# Patient Record
Sex: FEMALE | Race: BLACK OR AFRICAN AMERICAN | Employment: FULL TIME | URBAN - METROPOLITAN AREA
[De-identification: names, ages, dates, MRNs, and addresses within clinical notes are randomized per-mention and may not be internally consistent; named-entity substitution may affect disease eponyms.]

---

## 2021-05-19 NOTE — TELEPHONE ENCOUNTER
FUTURE VISIT INFORMATION      FUTURE VISIT INFORMATION:    Date: 6/29/21    Time: 10:00am    [place] [facility] [place]:    Referring provider:  self    Referring providers clinic:  N/A    Reason for visit/diagnosis  Pannicuelctomy    RECORDS REQUESTED FROM:       No recs to collect per Pt

## 2021-06-29 ENCOUNTER — PRE VISIT (OUTPATIENT)
Dept: PLASTIC SURGERY | Facility: CLINIC | Age: 41
End: 2021-06-29

## 2022-03-25 ENCOUNTER — OFFICE VISIT (OUTPATIENT)
Dept: PLASTIC SURGERY | Facility: AMBULATORY SURGERY CENTER | Age: 42
End: 2022-03-25
Payer: COMMERCIAL

## 2022-03-25 VITALS — HEIGHT: 67 IN | BODY MASS INDEX: 32.8 KG/M2 | WEIGHT: 209 LBS

## 2022-03-25 DIAGNOSIS — E65 ABDOMINAL PANNUS: Primary | ICD-10-CM

## 2022-03-25 PROCEDURE — 99204 OFFICE O/P NEW MOD 45 MIN: CPT | Performed by: PLASTIC SURGERY

## 2022-03-25 RX ORDER — CEFADROXIL 500 MG/1
CAPSULE ORAL
COMMUNITY
Start: 2021-11-26 | End: 2022-07-29

## 2022-03-25 RX ORDER — BENZOYL PEROXIDE 50 MG/ML
LIQUID TOPICAL
COMMUNITY
Start: 2022-03-16 | End: 2022-07-29

## 2022-03-25 NOTE — PROGRESS NOTES
Chief complaint:  Abdominal wall lipodystrophy, hanging abdominal wall pannus.     History of present illness:  This is 41 year old lady who presents with diffuse abdominal wall lipodystrophy as well as skin laxity and hanging abdominal wall pannus over the suprapubic region. Patient does complain of presence of intertrigo below the infra abdominal fold and behind the pannus for the last 3 years.  The episodes of intertrigo are recurrent and she treats them with nystatin powder as well of ointments and applying dry clothing to prevent wetness in the area.  She is interested in body contouring consultation to prevent these recurrent episodes of intertrigo.    Past medical history:  No past medical history on file.     Past surgical history:  Functional septoplasty      Allergies:  Sulfa drugs     Medications:       Current Outpatient Medications:      benzoyl peroxide 5 % external liquid, Apply to face and neck daily in shower, Disp: , Rfl:      BENZOYL PEROXIDE WASH 5 % external liquid, APPLY TO FACE AND NECK DAILY IN SHOWER, Disp: , Rfl:      cefadroxil (DURICEF) 500 MG capsule, , Disp: , Rfl:      cholecalciferol 50 MCG (2000 UT) CAPS, Take 2,000 Units by mouth, Disp: , Rfl:      Birth control pills: YES     Family history:  Denies venous Thromboembolism (VTE)    GYN history:  G 4, P 4     Social History:  Denies tobacco, drinks alcohol occasionally     Review of systems:  General ROS: No complaints or constitutional symptoms  Skin: Complains of multiple recurrent episodes of intertrigo in the infra abdominal fold area.  Hematologic/Lymphatic: No symptoms or complaints  Psychiatric: No symptoms or complaints  Endocrine: No excessive fatigue, no hypermetabolic symptoms reported  Respiratory ROS: No cough, shortness of breath, or wheezing  Cardiovascular ROS: No chest pain or dyspnea on exertion  Breast ROS: Denies nipple discharge, denies nipple inversion, denies palpable breast masses, denies changes in the color  "of the skin of both breasts  Gastrointestinal ROS: No abdominal pain, nausea, diarrhea, or constipation  Musculoskeletal ROS: Occasional back pain.  Neurological ROS: No focal neurologic defects reported.       Physical exam:     Ht 1.702 m (5' 7\")   Wt 94.8 kg (209 lb)   BMI 32.73 kg/m    General: Alert, cooperative, appears stated age   Skin: Skin color, texture, turgor normal, no rashes or lesions   Lymphatic: No obvious adenopathy, no swelling   Eyes: No scleral icterus, pupils equal  HENT: No traumatic injury to the head or face, no gross abnormalities  Lungs: Normal respiratory effort, breath sounds equal bilaterally  Heart: Regular rate and rhythm  Breasts: Not examined  Abdomen: Presents with diffuse lipodystrophy, striae and cellulite throughout the whole abdominal wall.  There is severe skin laxity throughout the whole anterior abdominal wall.  This skin laxity is more evident during flexion of her torso over the pelvis.  There is important amount of lipodystrophy on bilateral flank areas as well as in both supraumbilical and infraumbilical region.  There is hanging abdominal wall pannus over the suprapubic region.  The umbilicus is deformed secondary to the weight of the supraumbilical lipodystrophy.  The umbilicus is also inferiorly displaced secondary to the weight of the infra umbilical hanging pannus that is pulling the umbilicus down.  I did not see any evidence of intertrigo in the infra abdominal fold at this time.  Patient does present with diastases recti.  I did not palpate any evidence of umbilical hernias or incisional hernias.  Abdomen is otherwise soft, non-distended and non-tender to palpation.  Extremities: Patient does not present with varicose veins but she does have diffuse cellulite on bilateral thighs.  Neurologic: Grossly intact                                      Assessment:     41 year old female with abdominal wall lipodystrophy, excessive abdominal skin laxity and hanging " abdominal wall pannus with inferiorly displaced umbilicus.  She also presents with diastases recti.     Her Caprini score is as follows: one- point for BMI greater than 25, two-points because any body contouring procedure will take more than 45 minutes,  and one-point because she is taking birth control pills. Total risk factor score is 4 points.  Patient will not require postoperative chemoprophylaxis.        PLAN:      I had a lengthy discussion with Aditi about her body contouring options for her abdomen.  I showed her before and after pictures of body contouring procedures.     We discussed the options of panniculectomy, pzqcm-jn-oog's panniculectomy and cosmetic abdominoplasty with bilateral liposuction.     We talked that with plain panniculectomy she will still have a displaced umbilicus and excessive supraumbilical and epigastric area fullness, skin laxity and lipodystrophy.  With this technique there will no be plication of rectus abdominals or umbilical transposition. Therefore, she will still have diastases recti and displaced umbilicus.       With the krqat-qg-wgn's panniculectomy she will have a midline vertical scar as well as transverse lower abdominal scar.  However, with this technique the supraumbilical and epigastric fullness as well as bilateral flank lipodystrophy could be greatly improved.       With the cosmetic abdominoplasty and suction assisted liposuction (SAL), she will have rectus abdominal muscle plication as well as umbilical transposition and improvement of supraumbilical and epigastric area fullness as well as improvement of the bilateral flank lipodystrophy after SAL.  Her abdominoplasty scar will be below the bikini line.     We discussed that the panniculectomies are usually covered by insurance and the abdominoplasty with liposuction is not.     Patient told me that she is interested in having a dbkfr-ye-xbt's panniculectomy, with both vertical midline incision and the lower  abdominal transverse incision.  She told me that she does not mind the midline vertical scar.  She is greatly interested in removing the infra abdominal wall pannus as well as her supra umbilical and epigastric fullness that she is presenting with, plus the bilateral flank lipodystrophy.  She feels that the wkceh-tm-jgl's panniculectomy will improve those areas.     I agree with this approach.     I discussed with her that this is an outpatient procedure that usually last between 4 to 5 hours.       We also discussed the potential risks for surgery and they include but are not limited to: scarring, wound dehiscence, wound healing problems, bleeding and hematoma, infection, seroma, contour irregularities, intraperitoneal injury requiring laparotomy, risk for further surgeries.  Patient has acknowledged all these risks and is interested to proceed with surgery.      Time spent with patient 45 minutes.    Tonny Franco MD , FACS   Diplomate American Board of Plastic Surgery  Diplomate American Board of Surgery  [facility]  Division of Plastic & Reconstructive Surgery   Office: [phone]   3/26/2022 at 1:56 PM

## 2022-03-25 NOTE — LETTER
3/25/2022         RE: Aditi Turner  [address]  [facility] [address]        Dear Colleague,    Thank you for referring your patient, Aditi Turner, to the [facility]. Please see a copy of my visit note below.    Chief complaint:  Abdominal wall lipodystrophy, hanging abdominal wall pannus.     History of present illness:  This is 41 year old lady who presents with diffuse abdominal wall lipodystrophy as well as skin laxity and hanging abdominal wall pannus over the suprapubic region. Patient does complain of presence of intertrigo below the infra abdominal fold and behind the pannus for the last 3 years.  The episodes of intertrigo are recurrent and she treats them with nystatin powder as well of ointments and applying dry clothing to prevent wetness in the area.  She is interested in body contouring consultation to prevent these recurrent episodes of intertrigo.    Past medical history:  No past medical history on file.     Past surgical history:  Functional septoplasty      Allergies:  Sulfa drugs     Medications:       Current Outpatient Medications:      benzoyl peroxide 5 % external liquid, Apply to face and neck daily in shower, Disp: , Rfl:      BENZOYL PEROXIDE WASH 5 % external liquid, APPLY TO FACE AND NECK DAILY IN SHOWER, Disp: , Rfl:      cefadroxil (DURICEF) 500 MG capsule, , Disp: , Rfl:      cholecalciferol 50 MCG (2000 UT) CAPS, Take 2,000 Units by mouth, Disp: , Rfl:      Birth control pills: YES     Family history:  Denies venous Thromboembolism (VTE)    GYN history:  G 4, P 4     Social History:  Denies tobacco, drinks alcohol occasionally     Review of systems:  General ROS: No complaints or constitutional symptoms  Skin: Complains of multiple recurrent episodes of intertrigo in the infra abdominal fold area.  Hematologic/Lymphatic: No symptoms or complaints  Psychiatric: No symptoms or complaints  Endocrine: No excessive fatigue, no  "hypermetabolic symptoms reported  Respiratory ROS: No cough, shortness of breath, or wheezing  Cardiovascular ROS: No chest pain or dyspnea on exertion  Breast ROS: Denies nipple discharge, denies nipple inversion, denies palpable breast masses, denies changes in the color of the skin of both breasts  Gastrointestinal ROS: No abdominal pain, nausea, diarrhea, or constipation  Musculoskeletal ROS: Occasional back pain.  Neurological ROS: No focal neurologic defects reported.       Physical exam:     Ht 1.702 m (5' 7\")   Wt 94.8 kg (209 lb)   BMI 32.73 kg/m    General: Alert, cooperative, appears stated age   Skin: Skin color, texture, turgor normal, no rashes or lesions   Lymphatic: No obvious adenopathy, no swelling   Eyes: No scleral icterus, pupils equal  HENT: No traumatic injury to the head or face, no gross abnormalities  Lungs: Normal respiratory effort, breath sounds equal bilaterally  Heart: Regular rate and rhythm  Breasts: Not examined  Abdomen: Presents with diffuse lipodystrophy, striae and cellulite throughout the whole abdominal wall.  There is severe skin laxity throughout the whole anterior abdominal wall.  This skin laxity is more evident during flexion of her torso over the pelvis.  There is important amount of lipodystrophy on bilateral flank areas as well as in both supraumbilical and infraumbilical region.  There is hanging abdominal wall pannus over the suprapubic region.  The umbilicus is deformed secondary to the weight of the supraumbilical lipodystrophy.  The umbilicus is also inferiorly displaced secondary to the weight of the infra umbilical hanging pannus that is pulling the umbilicus down.  I did not see any evidence of intertrigo in the infra abdominal fold at this time.  Patient does present with diastases recti.  I did not palpate any evidence of umbilical hernias or incisional hernias.  Abdomen is otherwise soft, non-distended and non-tender to palpation.  Extremities: Patient " does not present with varicose veins but she does have diffuse cellulite on bilateral thighs.  Neurologic: Grossly intact                                      Assessment:     41 year old female with abdominal wall lipodystrophy, excessive abdominal skin laxity and hanging abdominal wall pannus with inferiorly displaced umbilicus.  She also presents with diastases recti.     Her Caprini score is as follows: one- point for BMI greater than 25, two-points because any body contouring procedure will take more than 45 minutes,  and one-point because she is taking birth control pills. Total risk factor score is 4 points.  Patient will not require postoperative chemoprophylaxis.        PLAN:      I had a lengthy discussion with Aditi about her body contouring options for her abdomen.  I showed her before and after pictures of body contouring procedures.     We discussed the options of panniculectomy, hpeov-uo-bct's panniculectomy and cosmetic abdominoplasty with bilateral liposuction.     We talked that with plain panniculectomy she will still have a displaced umbilicus and excessive supraumbilical and epigastric area fullness, skin laxity and lipodystrophy.  With this technique there will no be plication of rectus abdominals or umbilical transposition. Therefore, she will still have diastases recti and displaced umbilicus.       With the biypa-xj-lwu's panniculectomy she will have a midline vertical scar as well as transverse lower abdominal scar.  However, with this technique the supraumbilical and epigastric fullness as well as bilateral flank lipodystrophy could be greatly improved.       With the cosmetic abdominoplasty and suction assisted liposuction (SAL), she will have rectus abdominal muscle plication as well as umbilical transposition and improvement of supraumbilical and epigastric area fullness as well as improvement of the bilateral flank lipodystrophy after SAL.  Her abdominoplasty scar will be below the  bikini line.     We discussed that the panniculectomies are usually covered by insurance and the abdominoplasty with liposuction is not.     Patient told me that she is interested in having a dmebn-yc-uxq's panniculectomy, with both vertical midline incision and the lower abdominal transverse incision.  She told me that she does not mind the midline vertical scar.  She is greatly interested in removing the infra abdominal wall pannus as well as her supra umbilical and epigastric fullness that she is presenting with, plus the bilateral flank lipodystrophy.  She feels that the vmgtb-fx-yyy's panniculectomy will improve those areas.     I agree with this approach.     I discussed with her that this is an outpatient procedure that usually last between 4 to 5 hours.       We also discussed the potential risks for surgery and they include but are not limited to: scarring, wound dehiscence, wound healing problems, bleeding and hematoma, infection, seroma, contour irregularities, intraperitoneal injury requiring laparotomy, risk for further surgeries.  Patient has acknowledged all these risks and is interested to proceed with surgery.      Time spent with patient 45 minutes.    Tonny Franco MD , FACS   Diplomate American Board of Plastic Surgery  Diplomate American Board of Surgery  [facility]  Division of Plastic & Reconstructive Surgery   Office: [phone]   3/26/2022 at 1:56 PM       Please see dictated note.           Again, thank you for allowing me to participate in the care of your patient.        Sincerely,        Tonny Franco MD

## 2022-04-12 ENCOUNTER — DOCUMENTATION ONLY (OUTPATIENT)
Dept: PLASTIC SURGERY | Facility: AMBULATORY SURGERY CENTER | Age: 42
End: 2022-04-12
Payer: COMMERCIAL

## 2022-04-12 ENCOUNTER — TELEPHONE (OUTPATIENT)
Dept: SURGERY | Facility: CLINIC | Age: 42
End: 2022-04-12
Payer: COMMERCIAL

## 2022-04-12 NOTE — PROGRESS NOTES
PA Submitted to [facility]  DOS: 4/25/22 - 11/25/22  CPT Code: 44306, 37373  Dx Code(s): E65, L30.4, L98.7  Facility: [facility]

## 2022-04-12 NOTE — TELEPHONE ENCOUNTER
Health Call Center    Phone Message    May a detailed message be left on voicemail: yes     Reason for Call: Other: Aditi is calling in asking for a call back. She states that she spoke with her insurance company, and they have not received any information regarding her appt with Dr. Franco on 3/25. Pt is wondering if anything has been sent to them yet regarding her appt or surgery. Please call back as soon as possible to discuss.    Action Taken: Message routed to:  [facility] ([facility]    Travel Screening: Not Applicable

## 2022-04-13 NOTE — TELEPHONE ENCOUNTER
Called pt to let her know that her PA has been submitted, I let her know that a PA would take 2-4 weeks for a response and from there a surgery scheduler will contact her once an approval is received.

## 2022-04-19 ENCOUNTER — TELEPHONE (OUTPATIENT)
Dept: SURGERY | Facility: CLINIC | Age: 42
End: 2022-04-19
Payer: COMMERCIAL

## 2022-04-19 NOTE — LETTER
We've received instruction to get you scheduled for surgery with Dr Franco. We have that arranged as follows:     Surgery Date: 8/1/2022  Location: [facility]  Arrival Time: 7:00 am (Unless instructed differently by the pre-op call nurse)     Post op Appointment: 8/3/2022 at 1:15 pm with      Prep Instructions:     1. Please schedule a pre-op physical with your primary care doctor within 30 days prior to the surgery date. This may be virtual or face-to-face depending on your doctors preference. Call them right away to schedule this.    2. PCR-Rated COVID19 testing is required within 4 days of surgery. We have this scheduled for you at [facility], [address] [place] [address] on 7/28/2022 at 6:05 pm. Follow the specific instructions you receive by Katie. If your test is positive, your surgery will be canceled.     3. Nothing to eat or drink for 8 hours before surgery unless instructed differently by the pre-op nurse.    4. If the community sees a new COVID19 surge, your procedure may need to be postponed. We will contact you if this happens.     5. You will need an adult to drive you home and stay with you 24 hours after surgery. Public transportation or Medical Van Services are not permitted.    6. You may have one family member wait in the lobby at the surgery center during your surgery. Visitor restrictions are subject to change, please verify with the pre-op nurse when they call.    7. You will be screened for high-risk exposure to Covid-19 during the pre-op call.  We encourage you to quarantine yourself away from any Covid-19 people for 10 days before surgery to avoid possible last minute cancellations.   When you arrive to the surgery center, you will again be screened for COVID19 symptoms. If you screen positive, your surgery will need to be postponed.    8. We always encourage you to notify your insurance any time you have medical tests or procedures scheduled including  surgery. The number is usually right on the back of your insurance card. Please call [facility] Cost of Care at [phone] for the surgeon fees, and [facility] Cost of Care [phone] for facility fees if you need pricing information.     9. You will receive a call from a pre-op call nurse 1-3 days prior to surgery. She will go over more details with you.     Call our office if you have any questions! Thank you!

## 2022-04-19 NOTE — PROGRESS NOTES
Insurance [facility]  Auth #: [id number]  DOS: 4/25/2022 - 10/25/2022  CPT Code: 83169  Dx Code(s): E65, L30.4, L98.7  Facility: [facility]

## 2022-04-19 NOTE — TELEPHONE ENCOUNTER
Spoke with Melodie today regarding surgery scheduling     Patient was informed of the followin. Patient has been informed to consult their PCP/Cardiologist about stopping their blood thinners about a week before surgery.  2. Pre Op Physical will need to be done by PCP or Surgeon see below  3. Required Covid Test with in 4 days prior to surgery. (See Date Below)  4. Ride required after surgery, can not use [employer] or public transportation.    Patient was informed that failure to do so may result in cancellation of surgery    We've received instruction to get you scheduled for surgery with Dr Franco. We have that arranged as follows:     Surgery Date: 2022  Location: [facility]  Arrival Time: 7:00 am (Unless instructed differently by the pre-op call nurse)     Post op Appointment: 8/3/2022 at 1:15 pm with      Prep Instructions:     1. Please schedule a pre-op physical with your primary care doctor within 30 days prior to the surgery date. This may be virtual or face-to-face depending on your doctors preference. Call them right away to schedule this.    2. PCR-Rated COVID19 testing is required within 4 days of surgery. We have this scheduled for you at [facility] Covid [facility], [address] [place] [address] on 2022 at 6:05 pm. Follow the specific instructions you receive by Katie. If your test is positive, your surgery will be canceled.     3. Nothing to eat or drink for 8 hours before surgery unless instructed differently by the pre-op nurse.    4. If the community sees a new COVID19 surge, your procedure may need to be postponed. We will contact you if this happens.     5. You will need an adult to drive you home and stay with you 24 hours after surgery. Public transportation or Medical Van Services are not permitted.    6. You may have one family member wait in the lobby at the surgery center during your surgery. Visitor restrictions are subject to change, please verify with the  pre-op nurse when they call.    7. You will be screened for high-risk exposure to Covid-19 during the pre-op call.  We encourage you to quarantine yourself away from any Covid-19 people for 10 days before surgery to avoid possible last minute cancellations.   When you arrive to the surgery center, you will again be screened for COVID19 symptoms. If you screen positive, your surgery will need to be postponed.    8. We always encourage you to notify your insurance any time you have medical tests or procedures scheduled including surgery. The number is usually right on the back of your insurance card. Please call [facility] Cost of Care at [phone] for the surgeon fees, and [facility] Cost of Care [phone] for facility fees if you need pricing information.     9. You will receive a call from a pre-op call nurse 1-3 days prior to surgery. She will go over more details with you.     Call our office if you have any questions! Thank you!           Surgery Letter sent via mail

## 2022-07-28 RX ORDER — SPIRONOLACTONE 50 MG/1
50 TABLET, FILM COATED ORAL 2 TIMES DAILY
COMMUNITY
End: 2022-09-23

## 2022-07-28 RX ORDER — ESTRADIOL 0.1 MG/G
2 CREAM VAGINAL
COMMUNITY
End: 2022-07-29

## 2022-07-28 RX ORDER — ACETAMINOPHEN 500 MG
500 TABLET ORAL EVERY 6 HOURS PRN
COMMUNITY
End: 2022-07-29

## 2022-07-28 RX ORDER — TRETINOIN 0.25 MG/G
CREAM TOPICAL AT BEDTIME
COMMUNITY

## 2022-07-28 RX ORDER — FLUTICASONE PROPIONATE 50 MCG
1 SPRAY, SUSPENSION (ML) NASAL DAILY
COMMUNITY
End: 2022-07-29

## 2022-07-29 ENCOUNTER — LAB (OUTPATIENT)
Dept: FAMILY MEDICINE | Facility: CLINIC | Age: 42
End: 2022-07-29
Payer: COMMERCIAL

## 2022-07-29 DIAGNOSIS — Z20.822 ENCOUNTER FOR LABORATORY TESTING FOR COVID-19 VIRUS: ICD-10-CM

## 2022-07-29 LAB — SARS-COV-2 RNA RESP QL NAA+PROBE: NEGATIVE

## 2022-07-29 PROCEDURE — U0005 INFEC AGEN DETEC AMPLI PROBE: HCPCS

## 2022-07-29 PROCEDURE — U0003 INFECTIOUS AGENT DETECTION BY NUCLEIC ACID (DNA OR RNA); SEVERE ACUTE RESPIRATORY SYNDROME CORONAVIRUS 2 (SARS-COV-2) (CORONAVIRUS DISEASE [COVID-19]), AMPLIFIED PROBE TECHNIQUE, MAKING USE OF HIGH THROUGHPUT TECHNOLOGIES AS DESCRIBED BY CMS-2020-01-R: HCPCS

## 2022-08-01 ENCOUNTER — ANESTHESIA EVENT (OUTPATIENT)
Dept: SURGERY | Facility: HOSPITAL | Age: 42
End: 2022-08-01
Payer: COMMERCIAL

## 2022-08-01 ENCOUNTER — HOSPITAL ENCOUNTER (OUTPATIENT)
Facility: HOSPITAL | Age: 42
Discharge: HOME | End: 2022-08-01
Attending: PLASTIC SURGERY | Admitting: PLASTIC SURGERY
Payer: COMMERCIAL

## 2022-08-01 ENCOUNTER — ANESTHESIA (OUTPATIENT)
Dept: SURGERY | Facility: HOSPITAL | Age: 42
End: 2022-08-01
Payer: COMMERCIAL

## 2022-08-01 VITALS
SYSTOLIC BLOOD PRESSURE: 102 MMHG | RESPIRATION RATE: 16 BRPM | HEART RATE: 77 BPM | DIASTOLIC BLOOD PRESSURE: 60 MMHG | OXYGEN SATURATION: 97 % | WEIGHT: 208.8 LBS | TEMPERATURE: 97.5 F | BODY MASS INDEX: 32.7 KG/M2

## 2022-08-01 DIAGNOSIS — E65 ABDOMINAL PANNUS: Primary | ICD-10-CM

## 2022-08-01 PROCEDURE — 88304 TISSUE EXAM BY PATHOLOGIST: CPT | Mod: TC | Performed by: PLASTIC SURGERY

## 2022-08-01 PROCEDURE — 250N000011 HC RX IP 250 OP 636: Performed by: STUDENT IN AN ORGANIZED HEALTH CARE EDUCATION/TRAINING PROGRAM

## 2022-08-01 PROCEDURE — 94640 AIRWAY INHALATION TREATMENT: CPT

## 2022-08-01 PROCEDURE — 250N000011 HC RX IP 250 OP 636: Performed by: ANESTHESIOLOGY

## 2022-08-01 PROCEDURE — 360N000076 HC SURGERY LEVEL 3, PER MIN: Performed by: PLASTIC SURGERY

## 2022-08-01 PROCEDURE — 250N000011 HC RX IP 250 OP 636: Performed by: PLASTIC SURGERY

## 2022-08-01 PROCEDURE — 999N000141 HC STATISTIC PRE-PROCEDURE NURSING ASSESSMENT: Performed by: PLASTIC SURGERY

## 2022-08-01 PROCEDURE — 370N000017 HC ANESTHESIA TECHNICAL FEE, PER MIN: Performed by: PLASTIC SURGERY

## 2022-08-01 PROCEDURE — 272N000001 HC OR GENERAL SUPPLY STERILE: Performed by: PLASTIC SURGERY

## 2022-08-01 PROCEDURE — 250N000025 HC SEVOFLURANE, PER MIN: Performed by: PLASTIC SURGERY

## 2022-08-01 PROCEDURE — 15830 EXC EXCESSIVE SKIN ABDOMEN: CPT | Performed by: PLASTIC SURGERY

## 2022-08-01 PROCEDURE — 250N000013 HC RX MED GY IP 250 OP 250 PS 637

## 2022-08-01 PROCEDURE — 250N000009 HC RX 250: Performed by: STUDENT IN AN ORGANIZED HEALTH CARE EDUCATION/TRAINING PROGRAM

## 2022-08-01 PROCEDURE — 710N000010 HC RECOVERY PHASE 1, LEVEL 2, PER MIN: Performed by: PLASTIC SURGERY

## 2022-08-01 PROCEDURE — 250N000011 HC RX IP 250 OP 636

## 2022-08-01 PROCEDURE — 258N000003 HC RX IP 258 OP 636: Performed by: ANESTHESIOLOGY

## 2022-08-01 PROCEDURE — 250N000013 HC RX MED GY IP 250 OP 250 PS 637: Performed by: ANESTHESIOLOGY

## 2022-08-01 PROCEDURE — 88304 TISSUE EXAM BY PATHOLOGIST: CPT | Mod: 26 | Performed by: PATHOLOGY

## 2022-08-01 PROCEDURE — 250N000009 HC RX 250

## 2022-08-01 PROCEDURE — 258N000003 HC RX IP 258 OP 636: Performed by: PLASTIC SURGERY

## 2022-08-01 PROCEDURE — 710N000012 HC RECOVERY PHASE 2, PER MINUTE: Performed by: PLASTIC SURGERY

## 2022-08-01 PROCEDURE — 258N000003 HC RX IP 258 OP 636: Mod: 59 | Performed by: STUDENT IN AN ORGANIZED HEALTH CARE EDUCATION/TRAINING PROGRAM

## 2022-08-01 PROCEDURE — 250N000009 HC RX 250: Performed by: PLASTIC SURGERY

## 2022-08-01 RX ORDER — SODIUM CHLORIDE, SODIUM LACTATE, POTASSIUM CHLORIDE, CALCIUM CHLORIDE 600; 310; 30; 20 MG/100ML; MG/100ML; MG/100ML; MG/100ML
INJECTION, SOLUTION INTRAVENOUS CONTINUOUS
Status: DISCONTINUED | OUTPATIENT
Start: 2022-08-01 | End: 2022-08-01 | Stop reason: HOSPADM

## 2022-08-01 RX ORDER — AMOXICILLIN 250 MG
1-2 CAPSULE ORAL 2 TIMES DAILY
Qty: 30 TABLET | Refills: 0 | Status: SHIPPED | OUTPATIENT
Start: 2022-08-01 | End: 2022-08-04

## 2022-08-01 RX ORDER — OXYCODONE HYDROCHLORIDE 5 MG/1
5 TABLET ORAL EVERY 4 HOURS PRN
Status: DISCONTINUED | OUTPATIENT
Start: 2022-08-01 | End: 2022-08-01 | Stop reason: HOSPADM

## 2022-08-01 RX ORDER — PROPOFOL 10 MG/ML
INJECTION, EMULSION INTRAVENOUS PRN
Status: DISCONTINUED | OUTPATIENT
Start: 2022-08-01 | End: 2022-08-01

## 2022-08-01 RX ORDER — LIDOCAINE HYDROCHLORIDE 20 MG/ML
INJECTION, SOLUTION INFILTRATION; PERINEURAL PRN
Status: DISCONTINUED | OUTPATIENT
Start: 2022-08-01 | End: 2022-08-01

## 2022-08-01 RX ORDER — ONDANSETRON 4 MG/1
4 TABLET, ORALLY DISINTEGRATING ORAL EVERY 30 MIN PRN
Status: DISCONTINUED | OUTPATIENT
Start: 2022-08-01 | End: 2022-08-01 | Stop reason: HOSPADM

## 2022-08-01 RX ORDER — ONDANSETRON 2 MG/ML
4 INJECTION INTRAMUSCULAR; INTRAVENOUS EVERY 30 MIN PRN
Status: DISCONTINUED | OUTPATIENT
Start: 2022-08-01 | End: 2022-08-01 | Stop reason: HOSPADM

## 2022-08-01 RX ORDER — CEPHALEXIN 500 MG/1
500 CAPSULE ORAL 3 TIMES DAILY
Qty: 21 CAPSULE | Refills: 0 | Status: SHIPPED | OUTPATIENT
Start: 2022-08-01 | End: 2022-08-08

## 2022-08-01 RX ORDER — ONDANSETRON 2 MG/ML
INJECTION INTRAMUSCULAR; INTRAVENOUS PRN
Status: DISCONTINUED | OUTPATIENT
Start: 2022-08-01 | End: 2022-08-01

## 2022-08-01 RX ORDER — LIDOCAINE 40 MG/G
CREAM TOPICAL
Status: DISCONTINUED | OUTPATIENT
Start: 2022-08-01 | End: 2022-08-01 | Stop reason: HOSPADM

## 2022-08-01 RX ORDER — NALOXONE HYDROCHLORIDE 0.4 MG/ML
0.2 INJECTION, SOLUTION INTRAMUSCULAR; INTRAVENOUS; SUBCUTANEOUS
Status: DISCONTINUED | OUTPATIENT
Start: 2022-08-01 | End: 2022-08-01 | Stop reason: HOSPADM

## 2022-08-01 RX ORDER — IPRATROPIUM BROMIDE AND ALBUTEROL SULFATE 2.5; .5 MG/3ML; MG/3ML
SOLUTION RESPIRATORY (INHALATION)
Status: COMPLETED
Start: 2022-08-01 | End: 2022-08-01

## 2022-08-01 RX ORDER — NALOXONE HYDROCHLORIDE 0.4 MG/ML
0.4 INJECTION, SOLUTION INTRAMUSCULAR; INTRAVENOUS; SUBCUTANEOUS
Status: DISCONTINUED | OUTPATIENT
Start: 2022-08-01 | End: 2022-08-01 | Stop reason: HOSPADM

## 2022-08-01 RX ORDER — IPRATROPIUM BROMIDE AND ALBUTEROL SULFATE 2.5; .5 MG/3ML; MG/3ML
3 SOLUTION RESPIRATORY (INHALATION)
Status: DISCONTINUED | OUTPATIENT
Start: 2022-08-01 | End: 2022-08-01 | Stop reason: HOSPADM

## 2022-08-01 RX ORDER — AMOXICILLIN 250 MG
1-2 CAPSULE ORAL 2 TIMES DAILY
Qty: 30 TABLET | Refills: 0 | Status: SHIPPED | OUTPATIENT
Start: 2022-08-01

## 2022-08-01 RX ORDER — PROPOFOL 10 MG/ML
INJECTION, EMULSION INTRAVENOUS CONTINUOUS PRN
Status: DISCONTINUED | OUTPATIENT
Start: 2022-08-01 | End: 2022-08-01

## 2022-08-01 RX ORDER — CEFAZOLIN SODIUM/WATER 2 G/20 ML
2 SYRINGE (ML) INTRAVENOUS
Status: COMPLETED | OUTPATIENT
Start: 2022-08-01 | End: 2022-08-01

## 2022-08-01 RX ORDER — HYDROCODONE BITARTRATE AND ACETAMINOPHEN 5; 325 MG/1; MG/1
1-2 TABLET ORAL EVERY 4 HOURS PRN
Qty: 30 TABLET | Refills: 0 | Status: SHIPPED | OUTPATIENT
Start: 2022-08-01 | End: 2022-08-04

## 2022-08-01 RX ORDER — MAGNESIUM HYDROXIDE 1200 MG/15ML
LIQUID ORAL PRN
Status: DISCONTINUED | OUTPATIENT
Start: 2022-08-01 | End: 2022-08-01 | Stop reason: HOSPADM

## 2022-08-01 RX ORDER — ONDANSETRON 4 MG/1
4 TABLET, ORALLY DISINTEGRATING ORAL EVERY 8 HOURS PRN
Qty: 9 TABLET | Refills: 0 | Status: SHIPPED | OUTPATIENT
Start: 2022-08-01 | End: 2022-08-04

## 2022-08-01 RX ORDER — FENTANYL CITRATE 50 UG/ML
INJECTION, SOLUTION INTRAMUSCULAR; INTRAVENOUS PRN
Status: DISCONTINUED | OUTPATIENT
Start: 2022-08-01 | End: 2022-08-01

## 2022-08-01 RX ORDER — ONDANSETRON 4 MG/1
4 TABLET, ORALLY DISINTEGRATING ORAL EVERY 8 HOURS PRN
Qty: 9 TABLET | Refills: 0 | Status: SHIPPED | OUTPATIENT
Start: 2022-08-01

## 2022-08-01 RX ORDER — DEXAMETHASONE SODIUM PHOSPHATE 10 MG/ML
INJECTION, SOLUTION INTRAMUSCULAR; INTRAVENOUS PRN
Status: DISCONTINUED | OUTPATIENT
Start: 2022-08-01 | End: 2022-08-01

## 2022-08-01 RX ORDER — MAGNESIUM SULFATE 4 G/50ML
4 INJECTION INTRAVENOUS ONCE
Status: COMPLETED | OUTPATIENT
Start: 2022-08-01 | End: 2022-08-01

## 2022-08-01 RX ORDER — DEXMEDETOMIDINE HYDROCHLORIDE 4 UG/ML
INJECTION, SOLUTION INTRAVENOUS
Status: DISCONTINUED
Start: 2022-08-01 | End: 2022-08-01 | Stop reason: HOSPADM

## 2022-08-01 RX ORDER — HYDROCODONE BITARTRATE AND ACETAMINOPHEN 5; 325 MG/1; MG/1
1-2 TABLET ORAL EVERY 4 HOURS PRN
Qty: 30 TABLET | Refills: 0 | Status: SHIPPED | OUTPATIENT
Start: 2022-08-01

## 2022-08-01 RX ORDER — FENTANYL CITRATE 50 UG/ML
25 INJECTION, SOLUTION INTRAMUSCULAR; INTRAVENOUS EVERY 5 MIN PRN
Status: DISCONTINUED | OUTPATIENT
Start: 2022-08-01 | End: 2022-08-01 | Stop reason: HOSPADM

## 2022-08-01 RX ORDER — MEPERIDINE HYDROCHLORIDE 25 MG/ML
12.5 INJECTION INTRAMUSCULAR; INTRAVENOUS; SUBCUTANEOUS
Status: DISCONTINUED | OUTPATIENT
Start: 2022-08-01 | End: 2022-08-01 | Stop reason: HOSPADM

## 2022-08-01 RX ORDER — FENTANYL CITRATE 50 UG/ML
100 INJECTION, SOLUTION INTRAMUSCULAR; INTRAVENOUS ONCE
Status: DISCONTINUED | OUTPATIENT
Start: 2022-08-01 | End: 2022-08-01 | Stop reason: HOSPADM

## 2022-08-01 RX ORDER — FENTANYL CITRATE 50 UG/ML
25 INJECTION, SOLUTION INTRAMUSCULAR; INTRAVENOUS
Status: DISCONTINUED | OUTPATIENT
Start: 2022-08-01 | End: 2022-08-01 | Stop reason: HOSPADM

## 2022-08-01 RX ORDER — CEPHALEXIN 500 MG/1
500 CAPSULE ORAL 3 TIMES DAILY
Qty: 21 CAPSULE | Refills: 0 | Status: SHIPPED | OUTPATIENT
Start: 2022-08-01 | End: 2022-08-04

## 2022-08-01 RX ORDER — FENTANYL CITRATE 50 UG/ML
100 INJECTION, SOLUTION INTRAMUSCULAR; INTRAVENOUS
Status: DISCONTINUED | OUTPATIENT
Start: 2022-08-01 | End: 2022-08-01 | Stop reason: HOSPADM

## 2022-08-01 RX ADMIN — FENTANYL CITRATE 25 MCG: 50 INJECTION, SOLUTION INTRAMUSCULAR; INTRAVENOUS at 19:24

## 2022-08-01 RX ADMIN — Medication 2 G: at 13:48

## 2022-08-01 RX ADMIN — PHENYLEPHRINE HYDROCHLORIDE 100 MCG: 10 INJECTION INTRAVENOUS at 16:55

## 2022-08-01 RX ADMIN — PHENYLEPHRINE HYDROCHLORIDE 100 MCG: 10 INJECTION INTRAVENOUS at 15:55

## 2022-08-01 RX ADMIN — ROCURONIUM BROMIDE 25 MG: 50 INJECTION, SOLUTION INTRAVENOUS at 15:20

## 2022-08-01 RX ADMIN — IPRATROPIUM BROMIDE AND ALBUTEROL SULFATE 3 ML: 2.5; .5 SOLUTION RESPIRATORY (INHALATION) at 18:35

## 2022-08-01 RX ADMIN — PROPOFOL 170 MG: 10 INJECTION, EMULSION INTRAVENOUS at 13:55

## 2022-08-01 RX ADMIN — MIDAZOLAM 2 MG: 1 INJECTION INTRAMUSCULAR; INTRAVENOUS at 13:46

## 2022-08-01 RX ADMIN — RACEPINEPHRINE HYDROCHLORIDE 0.5 ML: 11.25 SOLUTION RESPIRATORY (INHALATION) at 18:34

## 2022-08-01 RX ADMIN — OXYCODONE HYDROCHLORIDE 5 MG: 5 TABLET ORAL at 20:16

## 2022-08-01 RX ADMIN — DEXMEDETOMIDINE 0.5 MCG/KG/HR: 100 INJECTION, SOLUTION, CONCENTRATE INTRAVENOUS at 13:55

## 2022-08-01 RX ADMIN — ONDANSETRON 4 MG: 2 INJECTION INTRAMUSCULAR; INTRAVENOUS at 17:33

## 2022-08-01 RX ADMIN — MAGNESIUM SULFATE HEPTAHYDRATE 4 G: 80 INJECTION, SOLUTION INTRAVENOUS at 12:22

## 2022-08-01 RX ADMIN — SODIUM CHLORIDE, POTASSIUM CHLORIDE, SODIUM LACTATE AND CALCIUM CHLORIDE: 600; 310; 30; 20 INJECTION, SOLUTION INTRAVENOUS at 12:19

## 2022-08-01 RX ADMIN — HYDROMORPHONE HYDROCHLORIDE 0.5 MG: 1 INJECTION, SOLUTION INTRAMUSCULAR; INTRAVENOUS; SUBCUTANEOUS at 14:30

## 2022-08-01 RX ADMIN — SUGAMMADEX 200 MG: 100 INJECTION, SOLUTION INTRAVENOUS at 17:35

## 2022-08-01 RX ADMIN — PHENYLEPHRINE HYDROCHLORIDE 100 MCG: 10 INJECTION INTRAVENOUS at 14:21

## 2022-08-01 RX ADMIN — ROCURONIUM BROMIDE 5 MG: 50 INJECTION, SOLUTION INTRAVENOUS at 16:15

## 2022-08-01 RX ADMIN — IPRATROPIUM BROMIDE AND ALBUTEROL SULFATE 3 ML: .5; 3 SOLUTION RESPIRATORY (INHALATION) at 18:35

## 2022-08-01 RX ADMIN — HYDROCORTISONE SODIUM SUCCINATE 50 MG: 100 INJECTION, POWDER, FOR SOLUTION INTRAMUSCULAR; INTRAVENOUS at 18:49

## 2022-08-01 RX ADMIN — PROPOFOL 25 MCG/KG/MIN: 10 INJECTION, EMULSION INTRAVENOUS at 13:56

## 2022-08-01 RX ADMIN — FENTANYL CITRATE 50 MCG: 50 INJECTION, SOLUTION INTRAMUSCULAR; INTRAVENOUS at 14:29

## 2022-08-01 RX ADMIN — PHENYLEPHRINE HYDROCHLORIDE 100 MCG: 10 INJECTION INTRAVENOUS at 14:33

## 2022-08-01 RX ADMIN — SODIUM CHLORIDE, POTASSIUM CHLORIDE, SODIUM LACTATE AND CALCIUM CHLORIDE: 600; 310; 30; 20 INJECTION, SOLUTION INTRAVENOUS at 15:37

## 2022-08-01 RX ADMIN — DEXAMETHASONE SODIUM PHOSPHATE 10 MG: 10 INJECTION, SOLUTION INTRAMUSCULAR; INTRAVENOUS at 14:00

## 2022-08-01 RX ADMIN — ROCURONIUM BROMIDE 20 MG: 50 INJECTION, SOLUTION INTRAVENOUS at 14:27

## 2022-08-01 RX ADMIN — HYDROMORPHONE HYDROCHLORIDE 0.5 MG: 1 INJECTION, SOLUTION INTRAMUSCULAR; INTRAVENOUS; SUBCUTANEOUS at 17:14

## 2022-08-01 RX ADMIN — PHENYLEPHRINE HYDROCHLORIDE 100 MCG: 10 INJECTION INTRAVENOUS at 14:09

## 2022-08-01 RX ADMIN — FENTANYL CITRATE 100 MCG: 50 INJECTION, SOLUTION INTRAMUSCULAR; INTRAVENOUS at 13:55

## 2022-08-01 RX ADMIN — PHENYLEPHRINE HYDROCHLORIDE 0.3 MCG/KG/MIN: 10 INJECTION INTRAVENOUS at 16:03

## 2022-08-01 RX ADMIN — LIDOCAINE HYDROCHLORIDE 25 MG: 20 INJECTION, SOLUTION INFILTRATION; PERINEURAL at 13:55

## 2022-08-01 RX ADMIN — PHENYLEPHRINE HYDROCHLORIDE 100 MCG: 10 INJECTION INTRAVENOUS at 15:40

## 2022-08-01 RX ADMIN — ROCURONIUM BROMIDE 70 MG: 50 INJECTION, SOLUTION INTRAVENOUS at 13:56

## 2022-08-01 NOTE — ANESTHESIA PREPROCEDURE EVALUATION
Anesthesia Pre-Procedure Evaluation    Patient: Aditi Turner   MRN: [id number] : 1980        Procedure : Procedure(s):  PANNICULECTOMY WITH VERTICAL COMPONENT: [id number] PANNICULECTOMY          Past Medical History:   Diagnosis Date     Acid reflux      Acne      Anxiety      NICOLETTE II (cervical intraepithelial neoplasia II)      Depression      Frequent UTI       Past Surgical History:   Procedure Laterality Date     NASAL SEPTUM SURGERY        Allergies   Allergen Reactions     Sulfa Drugs Hives      Social History     Tobacco Use     Smoking status: Never Smoker     Smokeless tobacco: Never Used   Substance Use Topics     Alcohol use: Yes     Comment: occas. socially      Wt Readings from Last 1 Encounters:   22 94.7 kg (208 lb 12.8 oz)        Anesthesia Evaluation            ROS/MED HX  ENT/Pulmonary:     (+) asthma     Neurologic:  - neg neurologic ROS     Cardiovascular:  - neg cardiovascular ROS     METS/Exercise Tolerance:     Hematologic:  - neg hematologic  ROS     Musculoskeletal:  - neg musculoskeletal ROS     GI/Hepatic:       Renal/Genitourinary:  - neg Renal ROS     Endo:  - neg endo ROS     Psychiatric/Substance Use:  - neg psychiatric ROS     Infectious Disease:  - neg infectious disease ROS     Malignancy:  - neg malignancy ROS     Other:  - neg other ROS          Physical Exam    Airway  airway exam normal      Mallampati: II       Respiratory Devices and Support         Dental  no notable dental history         Cardiovascular   cardiovascular exam normal          Pulmonary   pulmonary exam normal                OUTSIDE LABS:  CBC: No results found for: WBC, HGB, HCT, PLT  BMP: No results found for: NA, POTASSIUM, CHLORIDE, CO2, BUN, CR, GLC  COAGS: No results found for: PTT, INR, FIBR  POC: No results found for: BGM, HCG, HCGS  HEPATIC: No results found for: ALBUMIN, PROTTOTAL, ALT, AST, GGT, ALKPHOS, BILITOTAL, BILIDIRECT, CADEN  OTHER: No results found for: PH, LACT, A1C, STEPHANIE,  PHOS, MAG, LIPASE, AMYLASE, TSH, T4, T3, CRP, SED    Anesthesia Plan    ASA Status:  2      Anesthesia Type: General.     - Airway: ETT              Consents    Anesthesia Plan(s) and associated risks, benefits, and realistic alternatives discussed. Questions answered and patient/representative(s) expressed understanding.    - Discussed:     - Discussed with:  Patient         Postoperative Care            Comments:                Gerardo Oates MD

## 2022-08-01 NOTE — ANESTHESIA PROCEDURE NOTES
Airway       Patient location during procedure: OR       Procedure Start/Stop Times: 8/1/2022 1:59 PM  Staff -        CRNA: Milena Stover APRN CRNA       Other Anesthesia Staff: Pily Garcia       Performed By: SRNAIndications and Patient Condition       Indications for airway management: brent-procedural       Induction type:intravenous       Mask difficulty assessment: 1 - vent by mask    Final Airway Details       Final airway type: endotracheal airway       Successful airway: ETT - single and Oral  Endotracheal Airway Details        ETT size (mm): 7.0       Cuffed: yes       Successful intubation technique: direct laryngoscopy       DL Blade Type: Painter 2       Grade View of Cords: 1       Adjucts: stylet and tooth guard       Position: Right       Measured from: lips       Secured at (cm): 22       Bite block used: None    Post intubation assessment        Placement verified by: capnometry, equal breath sounds and chest rise        Number of attempts at approach: 1       Number of other approaches attempted: 0       Secured with: silk tape       Ease of procedure: easy       Dentition: Intact and Unchanged    Medication(s) Administered   Medication Administration Time: 8/1/2022 1:59 PM

## 2022-08-01 NOTE — ANESTHESIA CARE TRANSFER NOTE
Patient: Aditi Turner    Procedure: Procedure(s):  PANNICULECTOMY WITH VERTICAL COMPONENT: [id number] PANNICULECTOMY       Diagnosis: Abdominal pannus [E65]  Diagnosis Additional Information: No value filed.    Anesthesia Type:   General     Note:    Oropharynx: oropharynx clear of all foreign objects  Level of Consciousness: drowsy  Oxygen Supplementation: face mask  Level of Supplemental Oxygen (L/min / FiO2): 10  Independent Airway: airway patency satisfactory and stable  Dentition: dentition unchanged  Vital Signs Stable: post-procedure vital signs reviewed and stable  Report to RN Given: handoff report given  Patient transferred to: PACU  Comments: Patient transferred to PACU by CRNA. Report given to PACU RN, all questions answered. Patient hemodynamically stable. CRNA ensured PACU RN was comfortable taking over care.  Handoff Report: Identifed the Patient, Identified the Reponsible Provider, Reviewed the pertinent medical history, Discussed the surgical course, Reviewed Intra-OP anesthesia mangement and issues during anesthesia, Set expectations for post-procedure period and Allowed opportunity for questions and acknowledgement of understanding      Vitals:  Vitals Value Taken Time   BP 98/56 08/01/22 1757   Temp 98.0    Pulse 64 08/01/22 1758   Resp 16 08/01/22 1758   SpO2 93 % 08/01/22 1758   Vitals shown include unvalidated device data.    Electronically Signed By: JORGE Stewart CRNA  August 1, 2022  6:00 PM

## 2022-08-01 NOTE — INTERVAL H&P NOTE
I have reviewed the surgical (or preoperative) H&P that is linked to this encounter, and examined the patient. There are no significant changes    Clinical Conditions Present on Arrival:  Clinically Significant Risk Factors Present on Admission                      Tonny Franco MD , FACS   Diplomate American Board of Plastic Surgery  Diplomate American Board of Surgery  Adj. Assistant Professor of Surgery  Division of Plastic & Reconstructive Surgery   [facility]  Office: [phone]   8/1/2022 at 12:16 PM

## 2022-08-01 NOTE — OR NURSING
Neb in progress, pt developed stridor. Sats dropped to 78 % . Dr. Brunson notified and orders received, resp therapist called. Imprlovement noted almost immeidatley.

## 2022-08-02 NOTE — ANESTHESIA POSTPROCEDURE EVALUATION
Patient: Aditi Turner    Procedure: Procedure(s):  PANNICULECTOMY WITH VERTICAL COMPONENT: [id number] PANNICULECTOMY       Anesthesia Type:  General    Note:  Disposition: Outpatient   Postop Pain Control: Uneventful            Sign Out: Well controlled pain   PONV: No   Neuro/Psych: Uneventful            Sign Out: Acceptable/Baseline neuro status   Airway/Respiratory:             Events: Bronchospasm            Sign Out: Acceptable/Baseline resp. status   CV/Hemodynamics: Uneventful            Sign Out: Acceptable CV status; No obvious hypovolemia; No obvious fluid overload   Other NRE: NONE   DID A NON-ROUTINE EVENT OCCUR? YES    Event details/Postop Comments:  In PACU patient had severe inspiratory and expiratory stridor representing bronchospasm, airway edema and possible post extubation croup. Patient treated with Rac epinephrine, duoneb and IV hydrocortisone. Symptoms improved significantly following treatment and patient was monitored closely for 3 hours following the event. She developed no further symptoms of stridor and was breathing comfortably on room air.            Last vitals:  Vitals Value Taken Time   /60 08/01/22 1915   Temp 36.4  C (97.5  F) 08/01/22 1915   Pulse 82 08/01/22 1920   Resp 14 08/01/22 1920   SpO2 100 % 08/01/22 1920   Vitals shown include unvalidated device data.    Electronically Signed By: Joe Brunson MD  August 1, 2022  8:19 PM

## 2022-08-03 LAB
PATH REPORT.COMMENTS IMP SPEC: NORMAL
PATH REPORT.COMMENTS IMP SPEC: NORMAL
PATH REPORT.FINAL DX SPEC: NORMAL
PATH REPORT.GROSS SPEC: NORMAL
PATH REPORT.MICROSCOPIC SPEC OTHER STN: NORMAL
PATH REPORT.RELEVANT HX SPEC: NORMAL
PHOTO IMAGE: NORMAL

## 2022-08-04 ENCOUNTER — OFFICE VISIT (OUTPATIENT)
Dept: PLASTIC SURGERY | Facility: AMBULATORY SURGERY CENTER | Age: 42
End: 2022-08-04
Payer: COMMERCIAL

## 2022-08-04 VITALS — DIASTOLIC BLOOD PRESSURE: 80 MMHG | SYSTOLIC BLOOD PRESSURE: 118 MMHG

## 2022-08-04 DIAGNOSIS — Z98.890 S/P PANNICULECTOMY: Primary | ICD-10-CM

## 2022-08-04 PROCEDURE — 99024 POSTOP FOLLOW-UP VISIT: CPT | Performed by: PLASTIC SURGERY

## 2022-08-04 RX ORDER — NITROFURANTOIN MACROCRYSTALS 50 MG/1
CAPSULE ORAL
COMMUNITY
Start: 2022-04-12

## 2022-08-04 RX ORDER — BENZOCAINE/MENTHOL 6 MG-10 MG
LOZENGE MUCOUS MEMBRANE
COMMUNITY
Start: 2020-08-17

## 2022-08-04 RX ORDER — NITROFURANTOIN 25; 75 MG/1; MG/1
CAPSULE ORAL
COMMUNITY
Start: 2021-09-19

## 2022-08-04 RX ORDER — MEDROXYPROGESTERONE ACETATE 150 MG/ML
150 INJECTION, SUSPENSION INTRAMUSCULAR
COMMUNITY
Start: 2022-04-29

## 2022-08-04 RX ORDER — ESTRADIOL 0.1 MG/G
1 CREAM VAGINAL
COMMUNITY
Start: 2022-04-11

## 2022-08-04 NOTE — PROGRESS NOTES
Ms. Turner is a 41 years old lady status post lxpjz-rz-cfg's panniculectomy.  She is 3 days out from surgery.  She is feeling well.  Denies shortness of breath.  Pain is well controlled.    At the physical exam, bilateral Sky drains with serosanguineous drainage.  Midline and horizontal incisions are healing well with no sign of infection or dehiscence.  There is no evidence of active bleeding or hematoma.  Umbilicus looks viable.    Plan: Continue with compression garments, continue ambulation, follow-up with me in 1 week to remove one of the drains.  Patient is happy with results.    Tonny Franco MD , FACS   Diplomate American Board of Plastic Surgery  Diplomate American Board of Surgery  Adj. Assistant Professor of Surgery  Division of Plastic & Reconstructive Surgery   [facility]  Office: [phone]   8/4/2022 at 11:44 AM          left flank pain

## 2022-08-04 NOTE — LETTER
8/4/2022         RE: Aditi Turner  [address]  [place] [facility] [address]        Dear Colleague,    Thank you for referring your patient, Aditi Turner, to the [facility]. Please see a copy of my visit note below.    Ms. Turner is a 41 years old lady status post hbrjy-qv-tsl's panniculectomy.  She is 3 days out from surgery.  She is feeling well.  Denies shortness of breath.  Pain is well controlled.    At the physical exam, bilateral Sky drains with serosanguineous drainage.  Midline and horizontal incisions are healing well with no sign of infection or dehiscence.  There is no evidence of active bleeding or hematoma.  Umbilicus looks viable.    Plan: Continue with compression garments, continue ambulation, follow-up with me in 1 week to remove one of the drains.  Patient is happy with results.    Tonny Franco MD , FACS   Diplomate American Board of Plastic Surgery  Diplomate American Board of Surgery  Adj. Assistant Professor of Surgery  Division of Plastic & Reconstructive Surgery   [facility]  Office: [phone]   8/4/2022 at 11:44 AM             Again, thank you for allowing me to participate in the care of your patient.        Sincerely,        Tonny Franco MD

## 2022-08-05 NOTE — OP NOTE
August 4, 2022    Aditi Turner      Preoperative diagnosis: Large abdominal wall pannus, recurrent episodes of intertrigo, excessive skin laxity supraumbilical region     Postoperative diagnosis: same     Procedure: Wyaso-qg-psm's panniculectomy      Surgeon: Tonny Franco MD.     Assistant: Zoey Stanford CSA (there was no plastic surgery resident available to assist).     Anesthesia: General     IV fluids:  1500 mL    Tumescent solution: 2000 mL of lactated Ringer's mixed with 2 mg of epinephrine (1 mg of epinephrine per 1000 mL of lactated Ringer's) in the abdominal wall pannus, midline and epigastric region    Estimated blood loss (EBL): 30 mL    Urine output: 1100 mL     Findings: Large abdominal wall pannus that was hanging over the pubic region.  Significant epigastric skin laxity with lipodystrophy.  Diastases recti.     Specimen: Abdominal wall pannus and vertical epigastric loose skin     Drains: two # 19 Croatian Sky drains. One on the midline and second one on the right lower quadrant.     Disposition: Patient tolerated procedure well, she was extubated and transferred to recovery room awake and in stable condition.     Indications:    Aditi Turner is a 41 year old patient with history of significant weight loss after diet and exercise.  The weight loss has now been stable for more than 12 months.  After this significant weight loss, the patient has now developed a great amount of abdominal wall skin laxity, especially a hanging abdominal wall pannus over the pubic region as well as significant skin laxity on the supra umbilical and epigastric region.  The friction of the hanging abdominal wall pannus over the suprapubic abdominal crease, is causing multiple, chronic and recurrent episode of intertrigo that have failed medical therapy.    Patient has then been offered a Maddx-xz-umb's panniculectomy in order to treat the infraumbilical hanging abdominal wall pannus, that is causing the  "recurrent episode of intertrigo in the pubic region, as well as the significant supraumbilical and epigastric skin laxity.    Risks of the surgery has been explained to the patient and they include but are not limited to scarring, both in the suprapubic region as well as along the midline (forming an inverted \"T\"), wound dehiscence, wound healing problems (especially at the level of the intersection of the vertical component of the incision with the horizontal component of the incision), infection, wound ischemia and necrosis, bleeding, hematoma, seroma, loss of the umbilicus, contour irregularities, venous thromboembolism (VTE) and pulmonary embolism (PE), need for further surgeries.  Patient has acknowledged all these risks and has signed informed consent agreeing to proceed.     Procedure:     Patient was identified in the preoperative holding area and preoperative IV antibiotics were given.    I also proceeded to perform preoperative markings on the anterior abdominal wall.  First, I draw the midline from the xiphoid process down to the vulvar cleft.  I stopped 7 cm superior from the vulvar cleft.  Then, I proceeded to perform, via Pinch test technique, the vertical markings of the area to be excised in the epigastric region. 19 cm were found to be adequate to safely excise on the epigastric region, 9 cm from each size of the midline.  These marking were drawn down up to the level of the umbilicus.    Then, I proceeded to perform the inferior markings along the inguinal crease bilaterally.  These markings intersected at the level of the midline and in the pubic region with the rianna that was 7 cm superior from the vulvar cleft.  The markings were extended laterally and superiorly in the direction of the anterior iliac superior spine (AISP).  From the AISP I draw a second and transverse curve line towards the umbilicus that intersected with the vertical markings that were coming down from the epigastric region and " that were parallel from the midline.  At this time, I also performed a Pinch test in order to make sure that this infra abdominal flap would be able to be closed without undue tension in the lower abdomen.    This is how the markings looked like:                          Patient was then brought to the operating room and was placed supine on the operating room table.  Patient already had sequential compression devices on both lower extremities and they were immediately activated.  After general anesthesia was obtained, a Echavarria catheter was introduced and the chest, abdomen, flanks, pubic region and upper thighs were then prepped and draped in sterile surgical fashion.     Utilizing a 2 mm Klein's infiltrating cannula, we proceeded to infiltrate the subcutaneous tissues of the marked pannus areas to be resected with tumescent solution. This solution was beneficial for hemostasis and to facilitate dissection.     Then, we proceeded to make incision along the inferior border of the marked pannus area along the inguinal crease and dissection was taken down with electrocautery to the Rectus Abdominis fascia. Dissection of the abdominal wall flap continued superiorly and towards the umbilicus and midline.      Once we reach the umbilicus we divided the inferior abdominal wall flap along its midline and detach the umbilicus with a scalpel #15 from this surrounding abdominal wall flap.  The umbilicus along its stalk remained attached to its opening on the rectus abdominis fascia.  At this time, we stop the dissection towards the epigastric area and proceed to reassess the markings of the upper border of this inferior abdominal wall flap.      We sat down the patient at approximately 45 degree angles and proceeded to pull down inferiorly the dissected inferior abdominal wall flap and marked the inferior edges of this flap over the inferior incision of the panniculectomy along the inguinal creases.  These new markings  coincided with the preoperative upper transverse markings.  After confirming that the flap could be closed without excessive tension, the abdominal wall flap was then divided over these markings.      This flap represented the inferior hanging abdominal wall pannus.  Said flap was sent to pathology as a specimen.     Then, we continue with the supraumbilical and epigastric dissection until we reach the xiphoid process.  At this time we confirm again the preoperative markings with a new Pinch test on this supraumbilical flap.  We were satisfied that the area will be closed without undue tension and this flap was then divided along its lateral markings and it was completely excised and sent to pathology as a specimen.    The total weight of the excised epigastric and infraumbilical pannus was: 4191 grams.    We inspected the integrity of the fascia of the rectus abdominis muscles along the midline and there was evidence of diastases recti. With methylene blue we marked the diastases recti approximately 2 cm laterally from each side of the midline.  Essentially, the width of the diastases recti was approximately 4 cm.  Utilizing 1-0 Prolene we proceeded to close this diastases with running intraaponeurotic stitches from the xiphoid down to umbilicus and from the umbilicus down to the symphysis pubis.    Irrigation was provided and we found that hemostasis was excellent.  A #19 Occitan Sky drain was placed along the midline and a second one on the right lower quadrant.  Each drain was secured to the skin with 2-0 silk stitch.    At this time, the supraumbilical/epigastric opening was temporarily approximated towards the midline with staples. This closure showed that there was no significant tension along the midline and no undermining was performed.  We then proceeded to approximate the inferior opening of the panniculectomy along the inguinal region to this upper abdominal flap.  This temporary closure was also  performed with staples, beginning laterally from each anterior iliac superior spine (AISP) towards the midline in order to decrease any tension on the midline.  Finally, when we reach the midline, we were satisfied that we wear able to approximate the vertical incision of the upper flap along its midline to the corresponding midline of the inferior opening of the panniculectomy without any tension.  Lack of tension in this intersection of the vertical incision with horizontal incision is desirable in order to prevent ischemia, necrosis and wound healing problems.    The new umbilical opening along the midline/vertical incision on the upper abdominal wall flap was determined by utilizing the intersection of this midline and the superior edge of bilateral iliac crests.  The umbilicus was secured to the surrounding subcutaneous tissues with 3-0 Vicryl buried interrupted stitches followed by 5-0 Monocryl running subcuticular stitches.    Both the midline and inferior panniculectomy incisions were closed in layers with 2-0 Vicryl buried interrupted stitches at the level of the deep fascia, followed by In sorb absorbable staples at the level of the subcutaneous tissue and then the skin with 4-0 Stratafix running subcuticular stitches.    Instrument count was reported by nursing personnel as correct, patient tolerated procedure well, she was then extubated and transferred to recovery room awake and in stable condition.      Tonny Franco MD , FACS   Diplomate American Board of Plastic Surgery  Diplomate American Board of Surgery  Adj. Assistant Professor of Surgery  Division of Plastic & Reconstructive Surgery   [facility]  Office: [phone]   8/4/2022 at 8:04 PM

## 2022-08-12 ENCOUNTER — OFFICE VISIT (OUTPATIENT)
Dept: PLASTIC SURGERY | Facility: AMBULATORY SURGERY CENTER | Age: 42
End: 2022-08-12
Payer: COMMERCIAL

## 2022-08-12 DIAGNOSIS — Z98.890 STATUS POST PANNICULECTOMY: Primary | ICD-10-CM

## 2022-08-12 PROCEDURE — 99024 POSTOP FOLLOW-UP VISIT: CPT | Performed by: PLASTIC SURGERY

## 2022-08-12 NOTE — LETTER
8/12/2022         RE: Aditi uTrner  [address]  [facility] [address]        Dear Colleague,    Thank you for referring your patient, Aditi Turner, to the [facility] SURGERY CLINIC [facility]. Please see a copy of my visit note below.    Ms. Turner is a 41 years old lady status post yuqlr-qj-brc's panniculectomy.  She is 10 days out from surgery.  She is doing well.    At the physical exam, all incisions are healing well with no evidence of infection or dehiscence or hematoma or seroma.  Left lower quadrant Sky drain was removed.  Right lower quadrant Sky drain remains in place.    Plan: Continue abdominal binder, continue Sky drain for another week, and I will see her again in 1 week for possible removal of this remaining drain.  Patient is happy with result and so am I.    Tonny Franco MD , FACS   Diplomate American Board of Plastic Surgery  Diplomate American Board of Surgery  Adj. Assistant Professor of Surgery  Division of Plastic & Reconstructive Surgery   [facility]  Office: [phone]   8/14/2022 at 9:26 PM         Again, thank you for allowing me to participate in the care of your patient.        Sincerely,        Tonny Franco MD

## 2022-08-15 NOTE — PROGRESS NOTES
Ms. Turner is a 41 years old lady status post rnuoe-lu-xwz's panniculectomy.  She is 10 days out from surgery.  She is doing well.    At the physical exam, all incisions are healing well with no evidence of infection or dehiscence or hematoma or seroma.  Left lower quadrant Sky drain was removed.  Right lower quadrant Sky drain remains in place.    Plan: Continue abdominal binder, continue Sky drain for another week, and I will see her again in 1 week for possible removal of this remaining drain.  Patient is happy with result and so am I.    Tonny Franco MD , FACS   Diplomate American Board of Plastic Surgery  Diplomate American Board of Surgery  Adj. Assistant Professor of Surgery  Division of Plastic & Reconstructive Surgery   [facility]  Office: [phone]   8/14/2022 at 9:26 PM

## 2022-08-16 ENCOUNTER — TELEPHONE (OUTPATIENT)
Dept: NURSING | Facility: CLINIC | Age: 42
End: 2022-08-16

## 2022-08-16 NOTE — TELEPHONE ENCOUNTER
Patient calling due to monica drain on her right lower quadrant fell out when getting into the shower tonight after having a zubbt-pk-jxl's panniculectomy on August 1st.     Dr. Franco paged at 12:43 Recommendations:   Fell out no problem continue to use binder   See him on Friday at follow up     Spoke with patient regarding providers recommendations and to care for the drain site as how she was told when the other drain was pulled and to call Dr. Franco's department tomorrow with further instructions.     Gina Mcghee RN on 8/16/2022 at 1:09 AM

## 2022-08-19 ENCOUNTER — OFFICE VISIT (OUTPATIENT)
Dept: PLASTIC SURGERY | Facility: AMBULATORY SURGERY CENTER | Age: 42
End: 2022-08-19
Payer: COMMERCIAL

## 2022-08-19 DIAGNOSIS — Z98.890 S/P PANNICULECTOMY: Primary | ICD-10-CM

## 2022-08-19 PROCEDURE — 99024 POSTOP FOLLOW-UP VISIT: CPT | Performed by: PLASTIC SURGERY

## 2022-08-19 NOTE — PROGRESS NOTES
Ms. Turner is a 41 years old lady status post yovbd-ep-epr panniculectomy.  She is 19 days out from surgery.  Patient is doing excellent.    At the physical exam, incisions are healing well, no evidence of dehiscence or ischemia.  There is no evidence of infection or hematoma or seroma.  All drains have been removed.  Patient presents with excellent contour.                                Plan: Continue with compression garment for the next 3 months, apply cocoa butter lotion along the scars, return to see me in 3 weeks.    Patient is doing excellent from plastic surgery standpoint and she is very happy.    Tonny Franco MD , FACS   Diplomate American Board of Plastic Surgery  Diplomate American Board of Surgery  Adj. Assistant Professor of Surgery  Division of Plastic & Reconstructive Surgery   [facility]  Office: [phone]   8/19/2022 at 6:28 PM

## 2022-08-19 NOTE — LETTER
8/19/2022         RE: Aditi Turner  [address]  [facility] [address]        Dear Colleague,    Thank you for referring your patient, Aditi Turner, to the [facility]. Please see a copy of my visit note below.      Ms. Turner is a 41 years old lady status post zxhqd-bq-hzm panniculectomy.  She is 19 days out from surgery.  Patient is doing excellent.    At the physical exam, incisions are healing well, no evidence of dehiscence or ischemia.  There is no evidence of infection or hematoma or seroma.  All drains have been removed.  Patient presents with excellent contour.                                Plan: Continue with compression garment for the next 3 months, apply cocoa butter lotion along the scars, return to see me in 3 weeks.    Patient is doing excellent from plastic surgery standpoint and she is very happy.    Tonny Franco MD , FACS   Diplomate American Board of Plastic Surgery  Diplomate American Board of Surgery  Adj. Assistant Professor of Surgery  Division of Plastic & Reconstructive Surgery   [facility]  Office: [phone]   8/19/2022 at 6:28 PM           Again, thank you for allowing me to participate in the care of your patient.        Sincerely,        Tonny Franco MD

## 2022-09-23 ENCOUNTER — DOCUMENTATION ONLY (OUTPATIENT)
Dept: SURGERY | Facility: CLINIC | Age: 42
End: 2022-09-23

## 2022-09-23 ENCOUNTER — OFFICE VISIT (OUTPATIENT)
Dept: PLASTIC SURGERY | Facility: AMBULATORY SURGERY CENTER | Age: 42
End: 2022-09-23
Payer: COMMERCIAL

## 2022-09-23 VITALS — DIASTOLIC BLOOD PRESSURE: 68 MMHG | SYSTOLIC BLOOD PRESSURE: 116 MMHG

## 2022-09-23 DIAGNOSIS — Z98.890 STATUS POST PANNICULECTOMY: Primary | ICD-10-CM

## 2022-09-23 PROCEDURE — 99024 POSTOP FOLLOW-UP VISIT: CPT | Performed by: PLASTIC SURGERY

## 2022-09-23 RX ORDER — DAPSONE 75 MG/G
GEL TOPICAL
COMMUNITY
Start: 2022-09-19

## 2022-09-23 RX ORDER — SPIRONOLACTONE 100 MG/1
TABLET, FILM COATED ORAL
COMMUNITY
Start: 2022-08-30

## 2022-09-23 NOTE — PROGRESS NOTES
Ms. Turner is a 42 years old lady status post euwhn-jt-fns's panniculectomy.  She is 7 weeks out from surgery.  Patient is feeling excellent.    At the physical exam, patient presents with a well-healed tjqox-yh-dtu's panniculectomy scar.  Excellent contour, no irregularities.                                Plan: May resume normal activities, continue with compression garment, continue to apply up cocoa butter lotion along the scars and return to see me in January 2023.  Patient is very happy with her result and so am I.    Tonny Franco MD , FACS   Diplomate American Board of Plastic Surgery  Diplomate American Board of Surgery  Adj. Assistant Professor of Surgery  Division of Plastic & Reconstructive Surgery   [facility]  Office: [phone]   9/23/2022 at 2:02 PM

## 2022-09-23 NOTE — LETTER
9/23/2022         RE: Aditi Turner  [address]  [facility] [address]        Dear Colleague,    Thank you for referring your patient, Aditi Turner, to the [facility]. Please see a copy of my visit note below.    Ms. Turner is a 42 years old lady status post edjtl-yp-wcr's panniculectomy.  She is 7 weeks out from surgery.  Patient is feeling excellent.    At the physical exam, patient presents with a well-healed hozit-qz-aax's panniculectomy scar.  Excellent contour, no irregularities.                                Plan: May resume normal activities, continue with compression garment, continue to apply up cocoa butter lotion along the scars and return to see me in January 2023.  Patient is very happy with her result and so am I.    Tonny Franco MD , FACS   Diplomate American Board of Plastic Surgery  Diplomate American Board of Surgery  Adj. Assistant Professor of Surgery  Division of Plastic & Reconstructive Surgery   [facility]  Office: [phone]   9/23/2022 at 2:02 PM         Again, thank you for allowing me to participate in the care of your patient.        Sincerely,        Tonny Franco MD

## 2022-09-23 NOTE — PROGRESS NOTES
Received FMLA form via Appt on 9/23     Leave Dates 08/01/2022- 08/22/2022    Faxed to [employer] @ [phone]    Will place to be scanned to HIM    Elaine Castillo

## 2023-01-20 ENCOUNTER — VIRTUAL VISIT (OUTPATIENT)
Dept: PLASTIC SURGERY | Facility: AMBULATORY SURGERY CENTER | Age: 43
End: 2023-01-20
Payer: COMMERCIAL

## 2023-01-20 DIAGNOSIS — Z98.890 STATUS POST PANNICULECTOMY: Primary | ICD-10-CM

## 2023-01-20 PROCEDURE — 99212 OFFICE O/P EST SF 10 MIN: CPT | Mod: 95 | Performed by: PLASTIC SURGERY

## 2023-01-20 NOTE — LETTER
"    1/20/2023         RE: Aditi Turner  [address]  [facility] [address]        Dear Colleague,    Thank you for referring your patient, Aditi Turner, to the [facility] PLASTIC [facility]IC [place]. Please see a copy of my visit note below.      The patient has been notified of following:     \"This telephone visit will be conducted via a call between you and your physician/provider. We have found that certain health care needs can be provided without the need for a physical exam.  This service lets us provide the care you need with a short phone conversation.  If a prescription is necessary we can send it directly to your pharmacy.  If lab work is needed we can place an order for that and you can then stop by our lab to have the test done at a later time.    Telephone visits are billed at different rates depending on your insurance coverage. During this emergency period, for some insurers they may be billed the same as an in-person visit.  Please reach out to your insurance provider with any questions.    If during the course of the call the physician/provider feels a telephone visit is not appropriate, you will not be charged for this service.\"    Patient has given verbal consent to a Telephone visit? Yes    What phone number would you like to be contacted at? [phone]    Patient would like to receive their AVS by [employer]    Are there any specific questions or needs that you would like addressed at your visit today? How the patient is looking        Distant Location (provider location):  On-site    Additional provider notes: Ms. Turner is a 42 years old lady status post wtcjm-hn-ean's panniculectomy.  She is 5-month out from surgery.  Patient is feeling excellent.    At the physical exam via [employer]ox[employer], patient present with excellent contour on her abdomen.  Well-healed cnizz-wf-mtn's panniculectomy scars, no keloids.  The abdomen looks tight with excellent contour.    Plan: Patient " doing excellent from plastic surgery standpoint.  She will follow-up with me as needed.  She is very happy with her results and so am I.    Phone call duration: 10  Minutes.    Tonny Franco MD , FACS   Diplomate American Board of Plastic Surgery  Diplomate American Board of Surgery  Adj. Assistant Professor of Surgery  Division of Plastic & Reconstructive Surgery   [facility]  Office: [phone]   1/20/2023 at 3:48 PM           Again, thank you for allowing me to participate in the care of your patient.        Sincerely,        Tonny Franco MD

## 2023-01-20 NOTE — PROGRESS NOTES
"  The patient has been notified of following:     \"This telephone visit will be conducted via a call between you and your physician/provider. We have found that certain health care needs can be provided without the need for a physical exam.  This service lets us provide the care you need with a short phone conversation.  If a prescription is necessary we can send it directly to your pharmacy.  If lab work is needed we can place an order for that and you can then stop by our lab to have the test done at a later time.    Telephone visits are billed at different rates depending on your insurance coverage. During this emergency period, for some insurers they may be billed the same as an in-person visit.  Please reach out to your insurance provider with any questions.    If during the course of the call the physician/provider feels a telephone visit is not appropriate, you will not be charged for this service.\"    Patient has given verbal consent to a Telephone visit? Yes    What phone number would you like to be contacted at? [phone]    Patient would like to receive their AVS by [employer]    Are there any specific questions or needs that you would like addressed at your visit today? How the patient is looking        Distant Location (provider location):  On-site    Additional provider notes: Ms. Turner is a 42 years old lady status post xwecq-xd-jeh's panniculectomy.  She is 5-month out from surgery.  Patient is feeling excellent.    At the physical exam via Doxity, patient present with excellent contour on her abdomen.  Well-healed izetq-ly-hff's panniculectomy scars, no keloids.  The abdomen looks tight with excellent contour.    Plan: Patient doing excellent from plastic surgery standpoint.  She will follow-up with me as needed.  She is very happy with her results and so am I.    Phone call duration: 10  Minutes.    Tonny Franco MD , FACS   Diplomate American Board of Plastic Surgery  Diplomate American Board " of Surgery  Adj. Assistant Professor of Surgery  Division of Plastic & Reconstructive Surgery   [facility]  Office: [phone]   1/20/2023 at 3:48 PM

## (undated) DEVICE — SYR BULB IRRIG DOVER 60 ML LATEX FREE 67000

## (undated) DEVICE — SPONGE LAP 18X18" X8435

## (undated) DEVICE — GLOVE SURG PI ULTRA TOUCH M SZ 6-1/2 LF

## (undated) DEVICE — DRAPE SHEET REV FOLD 3/4 9349

## (undated) DEVICE — BINDER ABDOMINAL 3PAN 46-62 XL 08140362

## (undated) DEVICE — CUSTOM PACK GEN MAJOR SBA5BGMHEA

## (undated) DEVICE — GOWN LG DISP 9515

## (undated) DEVICE — DRAIN BLAKE 19FR SIL 2231

## (undated) DEVICE — SUTURE MONOCRYL+ 4-0 PS-2 27IN MCP426H

## (undated) DEVICE — STPL SKIN 35W 6.9MM  PXW35

## (undated) DEVICE — MARKER SURG SKIN STRL 77734

## (undated) DEVICE — SU VICRYL+ 3-0 27IN SH UND VCP416H

## (undated) DEVICE — GLOVE UNDER INDICATOR PI SZ 7.0 LF 41670

## (undated) DEVICE — SUTURE MONOCRYL+ 5-0 18 PS2 UND MCP495G

## (undated) DEVICE — TUBING INFUSION INFILTRATION LIPOSUCTION 156" 24-6008

## (undated) DEVICE — PLATE GROUNDING ADULT W/CORD 9165L

## (undated) DEVICE — SOL NACL 0.9% IRRIG 1000ML BOTTLE 2F7124

## (undated) DEVICE — Device

## (undated) DEVICE — SOL NACL 0.9% INJ 1000ML BAG 2B1324X

## (undated) DEVICE — BINDER ABDOMINAL 3PANEL LG 45IN 08140345

## (undated) DEVICE — SUCTION TIP YANKAUER W/O VENT K86

## (undated) DEVICE — TRAY PREP DRY SKIN SCRUB 067

## (undated) DEVICE — SU PROLENE 0 CT-1 30" 8424H

## (undated) DEVICE — PREP POVIDONE-IODINE 7.5% SCRUB 4OZ BOTTLE MDS093945

## (undated) DEVICE — BLADE KNIFE SURG 15 371115

## (undated) DEVICE — SU SILK 2-0 FS-1 18" 685G

## (undated) DEVICE — SYR 10ML FINGER CONTROL W/O NDL 309695

## (undated) DEVICE — CLOSURE SYS SKIN PREMIERPRO EXOFINFUSION 4X60CM 3473

## (undated) DEVICE — STPL SKIN SUBCUTICULAR INSORB  2030

## (undated) DEVICE — DRSG DRAIN 4X4" 7086

## (undated) DEVICE — DRAPE CHEST 100X72X124 89227

## (undated) DEVICE — DRSG XEROFORM 1X8"

## (undated) DEVICE — DRAIN RESERVOIR 100ML JP 0070740

## (undated) DEVICE — GLOVE BIOGEL PI INDICATOR 8.0 LF 41680

## (undated) DEVICE — SU PROLENE 1-0 TP-1  30" BLUE 8824G

## (undated) DEVICE — GLOVE GAMMEX NEOPRENE ULTRA SZ 7.5 LF 8515

## (undated) DEVICE — PREP POVIDONE IODINE SOLUTION 10% 4OZ BOTTLE 29906-004

## (undated) DEVICE — SUCTION MANIFOLD NEPTUNE 2 SYS 1 PORT 702-025-000

## (undated) DEVICE — SUTURE VICRYL+ 2-0 27IN SH UND VCP417H

## (undated) DEVICE — CATH TRAY FOLEY SURESTEP 16FR DRAIN BAG STATOCK A899916

## (undated) DEVICE — BLADE KNIFE SURG 10 371110

## (undated) DEVICE — ESU PENCIL SMOKE EVAC W/ROCKER SWITCH 0703-047-000

## (undated) RX ORDER — FENTANYL CITRATE-0.9 % NACL/PF 10 MCG/ML
PLASTIC BAG, INJECTION (ML) INTRAVENOUS
Status: DISPENSED
Start: 2022-08-01

## (undated) RX ORDER — EPINEPHRINE 1 MG/ML
INJECTION, SOLUTION INTRAMUSCULAR; SUBCUTANEOUS
Status: DISPENSED
Start: 2022-08-01

## (undated) RX ORDER — FENTANYL CITRATE 50 UG/ML
INJECTION, SOLUTION INTRAMUSCULAR; INTRAVENOUS
Status: DISPENSED
Start: 2022-08-01

## (undated) RX ORDER — EPINEPHRINE 1 MG/ML
INJECTION INTRAMUSCULAR; INTRAVENOUS; SUBCUTANEOUS
Status: DISPENSED
Start: 2022-08-01